# Patient Record
Sex: MALE | Race: WHITE | HISPANIC OR LATINO | URBAN - METROPOLITAN AREA
[De-identification: names, ages, dates, MRNs, and addresses within clinical notes are randomized per-mention and may not be internally consistent; named-entity substitution may affect disease eponyms.]

---

## 2018-02-22 ENCOUNTER — EMERGENCY (EMERGENCY)
Facility: HOSPITAL | Age: 73
LOS: 1 days | Discharge: HOME | End: 2018-02-22
Attending: EMERGENCY MEDICINE | Admitting: EMERGENCY MEDICINE
Payer: MEDICARE

## 2018-02-22 VITALS
DIASTOLIC BLOOD PRESSURE: 101 MMHG | OXYGEN SATURATION: 98 % | HEIGHT: 60 IN | SYSTOLIC BLOOD PRESSURE: 164 MMHG | WEIGHT: 175.05 LBS | HEART RATE: 74 BPM | RESPIRATION RATE: 18 BRPM | TEMPERATURE: 98 F

## 2018-02-22 VITALS
HEART RATE: 80 BPM | DIASTOLIC BLOOD PRESSURE: 85 MMHG | OXYGEN SATURATION: 97 % | TEMPERATURE: 98 F | RESPIRATION RATE: 18 BRPM | SYSTOLIC BLOOD PRESSURE: 150 MMHG

## 2018-02-22 LAB
ALBUMIN SERPL ELPH-MCNC: 4.2 G/DL — SIGNIFICANT CHANGE UP (ref 3.3–5.2)
ALP SERPL-CCNC: 114 U/L — SIGNIFICANT CHANGE UP (ref 40–120)
ALT FLD-CCNC: 26 U/L — SIGNIFICANT CHANGE UP
ANION GAP SERPL CALC-SCNC: 14 MMOL/L — SIGNIFICANT CHANGE UP (ref 5–17)
AST SERPL-CCNC: 27 U/L — SIGNIFICANT CHANGE UP
BASOPHILS # BLD AUTO: 0 K/UL — SIGNIFICANT CHANGE UP (ref 0–0.2)
BASOPHILS NFR BLD AUTO: 0.3 % — SIGNIFICANT CHANGE UP (ref 0–2)
BILIRUB SERPL-MCNC: 0.2 MG/DL — LOW (ref 0.4–2)
BUN SERPL-MCNC: 19 MG/DL — SIGNIFICANT CHANGE UP (ref 8–20)
CALCIUM SERPL-MCNC: 9.9 MG/DL — SIGNIFICANT CHANGE UP (ref 8.6–10.2)
CHLORIDE SERPL-SCNC: 94 MMOL/L — LOW (ref 98–107)
CO2 SERPL-SCNC: 26 MMOL/L — SIGNIFICANT CHANGE UP (ref 22–29)
CREAT SERPL-MCNC: 0.93 MG/DL — SIGNIFICANT CHANGE UP (ref 0.5–1.3)
EOSINOPHIL # BLD AUTO: 0.1 K/UL — SIGNIFICANT CHANGE UP (ref 0–0.5)
EOSINOPHIL NFR BLD AUTO: 0.8 % — SIGNIFICANT CHANGE UP (ref 0–5)
GAS PNL BLDV: SIGNIFICANT CHANGE UP
GLUCOSE SERPL-MCNC: 336 MG/DL — HIGH (ref 70–115)
HCO3 BLDV-SCNC: 25 MMOL/L — SIGNIFICANT CHANGE UP (ref 21–29)
HCT VFR BLD CALC: 45.3 % — SIGNIFICANT CHANGE UP (ref 42–52)
HGB BLD-MCNC: 15.2 G/DL — SIGNIFICANT CHANGE UP (ref 14–18)
LYMPHOCYTES # BLD AUTO: 2.5 K/UL — SIGNIFICANT CHANGE UP (ref 1–4.8)
LYMPHOCYTES # BLD AUTO: 23.2 % — SIGNIFICANT CHANGE UP (ref 20–55)
MCHC RBC-ENTMCNC: 28.1 PG — SIGNIFICANT CHANGE UP (ref 27–31)
MCHC RBC-ENTMCNC: 33.6 G/DL — SIGNIFICANT CHANGE UP (ref 32–36)
MCV RBC AUTO: 83.9 FL — SIGNIFICANT CHANGE UP (ref 80–94)
MONOCYTES # BLD AUTO: 0.7 K/UL — SIGNIFICANT CHANGE UP (ref 0–0.8)
MONOCYTES NFR BLD AUTO: 6.6 % — SIGNIFICANT CHANGE UP (ref 3–10)
NEUTROPHILS # BLD AUTO: 7.3 K/UL — SIGNIFICANT CHANGE UP (ref 1.8–8)
NEUTROPHILS NFR BLD AUTO: 68.4 % — SIGNIFICANT CHANGE UP (ref 37–73)
PCO2 BLDV: 45 MMHG — SIGNIFICANT CHANGE UP (ref 35–50)
PH BLDV: 7.38 — SIGNIFICANT CHANGE UP (ref 7.32–7.43)
PLATELET # BLD AUTO: 315 K/UL — SIGNIFICANT CHANGE UP (ref 150–400)
PO2 BLDV: 119 MMHG — HIGH (ref 25–45)
POTASSIUM SERPL-MCNC: 4.9 MMOL/L — SIGNIFICANT CHANGE UP (ref 3.5–5.3)
POTASSIUM SERPL-SCNC: 4.9 MMOL/L — SIGNIFICANT CHANGE UP (ref 3.5–5.3)
PROT SERPL-MCNC: 8.4 G/DL — SIGNIFICANT CHANGE UP (ref 6.6–8.7)
RBC # BLD: 5.4 M/UL — SIGNIFICANT CHANGE UP (ref 4.6–6.2)
RBC # FLD: 13.2 % — SIGNIFICANT CHANGE UP (ref 11–15.6)
SAO2 % BLDV: 98 % — SIGNIFICANT CHANGE UP
SODIUM SERPL-SCNC: 134 MMOL/L — LOW (ref 135–145)
WBC # BLD: 10.7 K/UL — SIGNIFICANT CHANGE UP (ref 4.8–10.8)
WBC # FLD AUTO: 10.7 K/UL — SIGNIFICANT CHANGE UP (ref 4.8–10.8)

## 2018-02-22 PROCEDURE — 80053 COMPREHEN METABOLIC PANEL: CPT

## 2018-02-22 PROCEDURE — 96374 THER/PROPH/DIAG INJ IV PUSH: CPT

## 2018-02-22 PROCEDURE — 93005 ELECTROCARDIOGRAM TRACING: CPT

## 2018-02-22 PROCEDURE — T1013: CPT

## 2018-02-22 PROCEDURE — 99284 EMERGENCY DEPT VISIT MOD MDM: CPT

## 2018-02-22 PROCEDURE — 84484 ASSAY OF TROPONIN QUANT: CPT

## 2018-02-22 PROCEDURE — 82803 BLOOD GASES ANY COMBINATION: CPT

## 2018-02-22 PROCEDURE — 93010 ELECTROCARDIOGRAM REPORT: CPT

## 2018-02-22 PROCEDURE — 99284 EMERGENCY DEPT VISIT MOD MDM: CPT | Mod: 25

## 2018-02-22 PROCEDURE — 82962 GLUCOSE BLOOD TEST: CPT

## 2018-02-22 PROCEDURE — 36415 COLL VENOUS BLD VENIPUNCTURE: CPT

## 2018-02-22 PROCEDURE — 85027 COMPLETE CBC AUTOMATED: CPT

## 2018-02-22 RX ORDER — INSULIN HUMAN 100 [IU]/ML
8 INJECTION, SOLUTION SUBCUTANEOUS ONCE
Qty: 0 | Refills: 0 | Status: COMPLETED | OUTPATIENT
Start: 2018-02-22 | End: 2018-02-22

## 2018-02-22 RX ORDER — AZITHROMYCIN 500 MG/1
500 TABLET, FILM COATED ORAL ONCE
Qty: 0 | Refills: 0 | Status: DISCONTINUED | OUTPATIENT
Start: 2018-02-22 | End: 2018-02-22

## 2018-02-22 RX ORDER — INSULIN HUMAN 100 [IU]/ML
6 INJECTION, SOLUTION SUBCUTANEOUS ONCE
Qty: 0 | Refills: 0 | Status: COMPLETED | OUTPATIENT
Start: 2018-02-22 | End: 2018-02-22

## 2018-02-22 RX ORDER — SODIUM CHLORIDE 9 MG/ML
1000 INJECTION INTRAMUSCULAR; INTRAVENOUS; SUBCUTANEOUS ONCE
Qty: 0 | Refills: 0 | Status: COMPLETED | OUTPATIENT
Start: 2018-02-22 | End: 2018-02-22

## 2018-02-22 RX ADMIN — INSULIN HUMAN 6 UNIT(S): 100 INJECTION, SOLUTION SUBCUTANEOUS at 18:03

## 2018-02-22 RX ADMIN — SODIUM CHLORIDE 1000 MILLILITER(S): 9 INJECTION INTRAMUSCULAR; INTRAVENOUS; SUBCUTANEOUS at 17:49

## 2018-02-22 NOTE — CONSULT NOTE ADULT - ASSESSMENT
73M hx HTN, HLD, DM was noted to have RBBB on routine EKG, His EKG was interpreted as acute changes and referred to ED. Denied cardiac symptoms asymptomatic      A/P  Abnormal EKG, RBBB: Outpt cardiology FU recommended  Hypercholesteremia: Sttain  Hypertension: Self BP checks, stable on medications  Diabetes: Agressive life style modifictaions    d/w ER team.

## 2018-02-22 NOTE — ED ADULT TRIAGE NOTE - CHIEF COMPLAINT QUOTE
patient arrived ambulatory via EMS, awake ,alert, and oriented times 3, breathing unlabored.  patient sent from [facility] office due to abnormal EKG.  patient has no complaints at present time. No NP.  Blood sugar 400.  patient states ran out of metformin, did not take today

## 2018-02-22 NOTE — ED PROVIDER NOTE - OBJECTIVE STATEMENT
Pt was sent by doctor's office with elevated blood sugar by fingerstick. PT without any pain or discomfort. Denies cough, fever, chills, nausea, vomiting. Pt went to [facility]D for prescription refill only.

## 2018-02-22 NOTE — ED ADULT NURSE NOTE - CHPI ED SYMPTOMS NEG
no weakness/no dizziness/no nausea/no fever/no pain/no chills/no vomiting/no numbness/no tingling/no decreased eating/drinking

## 2018-02-22 NOTE — ED ADULT NURSE NOTE - OBJECTIVE STATEMENT
pt reports "the clinic sent me here because I ran out of my meds and wasn't taking them. also I haven't been going to the heart doctor. I feel fine." pt denies any symptoms, sitting calm in bed. a and o x3. breathing even and unlabored. will continue to monitor.

## 2018-02-22 NOTE — CONSULT NOTE ADULT - SUBJECTIVE AND OBJECTIVE BOX
CARDIOLOGY CONSULTATION NOTE       History obtained by: Patient, family and medical record     obtained: Yes    Chief complaint:    Patient is a 73y old  Male who presents with a chief complaint of Abnormal EKG    HPI:    ABNORMAL EKG  Pt went to PCP office today for routine FU.  His EKG was taken there and showed SR, RBBB interpreted as acute changes and pt was referred tp ED.  He denies cardiac symptoms.  Denied fever, chills, wheezing, dyspnea, orthopnea, PND< edema, CP, palpitation, nausea, vomiting, hematuria, melena, syncope, near syncope.  He has been physically active, denied exertional symptoms.     REVIEW OF SYMPTOMS:   Constitutional: Denied: fever, chills, weight loss or gain  Eyes: Denied: reddened ayes, eye discharge, eye pain  ENMT: Denied: ear pain, nasal discharge, mouth pain, throat pain or swelling  Cardiovascular: Denied: chest pain,  Chest pressure, palpitation, arrhythmia, irregular or fast heart beats, edema  Respiratory: Denied: cough, phlegm production, wheezes, dyspnea, orthopnea, PND,   GI: Denied: Abdominal pain, nausea, vomiting, diarrhea, constipation, melena, rectal bleed  : Denied: hematuria, frequency  Musculoskeletal: Denied: Muscle aches, weakness, pain  Hematology/lymp: Denied: Bleeding disorder, anemia, blood clotting  Neuro: Denied: Headache, light headedness, dizziness, numbness, aphasia, dysarthria, seizure,  syncope, near syncope  Psych: Denied: depression, anxiety  Integumentary/skin: Denied: rash, bruises, ecchymosis, itching  Allergy/Immunology: denied environmental or drug allergies    ALL OTHER REVIEW OF SYSTEMS ARE NEGATIVE.    MEDICATIONS  (STANDING):  insulin regular  human recombinant. 8 Unit(s) IV Push once  sodium chloride 0.9% Bolus 1000 milliLiter(s) IV Bolus once  rescriptions:   * Patient Currently Takes Medications as of 22-Feb-2018 16:01 documented in Structured Notes  · 	ATORVASTATIN TAB 40MG: Hx, Schedule: 0, Status: Active, Transmission Status: None, Quantity: 30, Refills: None, Submitted By: Marc Rico, On Behalf Of: Marc Rico, Reference #: [id number], Substitution Permitted: Yes, None, Last Modified Date/Time: 22-Feb-2018 15:59, Last Modified By: Marc Rico  · 	FARXIGA      TAB 10MG: Hx, Schedule: 0, Status: Active, Transmission Status: None, Quantity: 30, Refills: None, Submitted By: Marc Rico, On Behalf Of: Marc Rico, Reference #: [id number], Substitution Permitted: Yes, None, Last Modified Date/Time: 22-Feb-2018 15:59, Last Modified By: Marc Rico  · 	GLIPIZIDE ER TAB 5MG: Hx, Schedule: 0, Status: Active, Transmission Status: None, Quantity: 30, Refills: None, Submitted By: Marc Rico, On Behalf Of: Marc Rico, Reference #: [id number], Substitution Permitted: Yes, None, Last Modified Date/Time: 22-Feb-2018 15:59, Last Modified By: Marc Rico  · 	LOSARTAN/HCT TAB 50-12.5: Hx, Schedule: 0, Status: Active, Transmission Status: None, Quantity: 30, Refills: None, Submitted By: Marc Rico, On Behalf Of: Marc Rico, Reference #: [id number], Substitution Permitted: Yes, None, Last Modified Date/Time: 22-Feb-2018 15:59, Last Modified By: Marc Rico  MEDICATIONS  (PRN):        PAST MEDICAL & SURGICAL HISTORY:  Hypercholesteremia  Hypertension  Diabetes  No significant past surgical history      FAMILY HISTORY: [place]      SOCIAL HISTORY:    CIGARETTES:  No    ALCOHOL: No    DRUGS: No    Vital Signs Last 24 Hrs  T(C): 36.8 (22 Feb 2018 14:35), Max: 36.8 (22 Feb 2018 14:35)  T(F): 98.2 (22 Feb 2018 14:35), Max: 98.2 (22 Feb 2018 14:35)  HR: 74 (22 Feb 2018 14:35) (74 - 74)  BP: 164/101 (22 Feb 2018 14:35) (164/101 - 164/101)  BP(mean): --  RR: 18 (22 Feb 2018 14:35) (18 - 18)  SpO2: 98% (22 Feb 2018 14:35) (98% - 98%)    PHYSICAL EXAM:      Constitutional: No fever, chills, NAD, Comfortable    Eyes: Not reddened, no discharge    ENMT: No discharge, No pain    Neck: No JVD, No Bruit    Back: No CVA tenderness    Respiratory: Clear to auscultation bilaterally    Cardiovascular: RRR, Normal S1-2, No S3-, No friction rub murmur    Gastrointestinal: Soft, NT/ND. BS+, No organomegaly    Extremities: No edema    Vascular: Distal pulses intact    Neurological: Alert, awake, oriented, able to move extremities, speach is clear    Skin: No ecchymosis, no rash    Musculoskeletal: Non tender, no weaknss    Psychiatric: Aproppriate mood, no anxiety        INTERPRETATION OF TELEMETRY:    ECG: SR, RBBB    I&O's Detail      LABS: P

## 2018-02-22 NOTE — ED PROVIDER NOTE - NS ED ROS FT
Pt denies headache.  Pt denies throat pain.  Pt denies earache.  Pt denies neck pain.  Pt denies chest pain.  Pt denies abdominal pain.   Pt denies back pain.   Pt denies joint pain.  Pt denies muscle aches.   Pt denies any rash, fever, or chills.

## 2018-02-22 NOTE — ED PROVIDER NOTE - CHPI ED SYMPTOMS NEG
no dizziness/no decreased eating/drinking/no back pain/no loss of consciousness/no nausea/no fever/no headache/no pain/no vomiting/no chills

## 2018-02-22 NOTE — ED PROVIDER NOTE - MEDICAL DECISION MAKING DETAILS
hyperglycemia. hyperglycemia, RBBB. Pt seen by cardiology, recommended outpatient f/u. Glucose controlled in ED.

## 2018-02-23 RX ORDER — DAPAGLIFLOZIN 10 MG/1
0 TABLET, FILM COATED ORAL
Qty: 30 | Refills: 0 | COMMUNITY

## 2018-02-23 RX ORDER — ATORVASTATIN CALCIUM 80 MG/1
0 TABLET, FILM COATED ORAL
Qty: 30 | Refills: 0 | COMMUNITY

## 2018-02-23 RX ORDER — LOSARTAN/HYDROCHLOROTHIAZIDE 100MG-25MG
0 TABLET ORAL
Qty: 30 | Refills: 0 | COMMUNITY
